# Patient Record
Sex: FEMALE | NOT HISPANIC OR LATINO | Employment: FULL TIME | URBAN - METROPOLITAN AREA
[De-identification: names, ages, dates, MRNs, and addresses within clinical notes are randomized per-mention and may not be internally consistent; named-entity substitution may affect disease eponyms.]

---

## 2025-05-16 DIAGNOSIS — M79.642 HAND PAIN, LEFT: Primary | ICD-10-CM

## 2025-05-18 NOTE — PROGRESS NOTES
[facility]  Hand and Upper Extremity Service  Initial evaluation / Consultation         Consult requested by Referring Physician: Dr. Christine     Chief Complaint: Left middle finger          59 y.o right hand dominant female presenting for left middle finger fracture. In mid-February she was walking her dog in [place] and got the leash wrapped around her finger, resulting in a fracture of the middle finger phalanx. She was seen by an orthopedic surgeon in [place] who performed a closed reduction in the office to correct a rotation deformity and then treated the patient nonoperatively with a serial of x-rays. She has now returned to [place] and still has pain and stiffness and is not happy with the appearence of the finger and presents for second opinion.          Please refer to New Patient Intake Form scanned into patient's electronic record for self reported past medical history, past surgical history, medications, allergies, family history, social history and 10 point review of systems    Examination:  Constitutional: Oriented to person, place, and time.  Appears well-developed and well-nourished.  Head: Normocephalic and atraumatic.  Eyes: Pupils are equal, round, and reactive to light.  Cardiovascular: Intact distal pulses.  Pulmonary/Chest/Breast: Effort normal. No respiratory distress.  Neurological: Alert and oriented to person, place, and time.  Skin: Skin is warm and dry.  Psychiatric: normal mood and affect.  Behavior is normal.  Musculoskeletal: Left hand reveals circumferential swelling around middle finger centered at the proximal phalanx and PIP joint. About a 5 degree extensor lag at the PIP joint. Active flexion to about 70 degrees. Full MP joint range of motion and about 30 degrees of DIP joint range of motion. Slight supination rotational deformity of the middle finger with flexion.        Personal Interpretation of Diagnostic studies: X-ray of left hand taken  today demonstrates spiral oblique fracture at the proximal phalanx of the middle finger that appears to be well healed radiographically with abundant callous formation. Fracture line is still visible and fracture is slightly displaced.        Impression:  Left middle finger proximal phalanx malunion       Plan: I believe the window to intervention has long passed and while surgery to correct deformity of the bone may make the x-rays look better, this will likely make her functional use of the hand much worse. I have recommended referral to therapy to see if this will help with mobilization but she may not be able to reestablish full composite digital flexion. She accepted a referral to therapy and I am happy to see her again in the future but I do not think surgery is the best option for this problem.       Follow up: As needed               Cyril Greene MD  [facility]  Department of Orthopaedic Surgery  Hand and Upper Extremity Reconstruction      Scribe Attestation  By signing my name below, I, Adis Alberts   attest that this documentation has been prepared under the direction and in the presence of Dr. Cyril Greene.      Dictation performed with the use of voice recognition software.  Syntax and grammatical errors may exist.

## 2025-05-19 ENCOUNTER — HOSPITAL ENCOUNTER (OUTPATIENT)
Dept: RADIOLOGY | Facility: CLINIC | Age: 60
Discharge: HOME | End: 2025-05-19
Payer: COMMERCIAL

## 2025-05-19 ENCOUNTER — APPOINTMENT (OUTPATIENT)
Dept: ORTHOPEDIC SURGERY | Facility: CLINIC | Age: 60
End: 2025-05-19
Payer: COMMERCIAL

## 2025-05-19 VITALS — WEIGHT: 155 LBS | HEIGHT: 68 IN | BODY MASS INDEX: 23.49 KG/M2

## 2025-05-19 DIAGNOSIS — S62.613P: Primary | ICD-10-CM

## 2025-05-19 DIAGNOSIS — M79.642 HAND PAIN, LEFT: ICD-10-CM

## 2025-05-19 PROCEDURE — 73130 X-RAY EXAM OF HAND: CPT | Mod: LT

## 2025-05-19 PROCEDURE — 73130 X-RAY EXAM OF HAND: CPT | Mod: LEFT SIDE | Performed by: STUDENT IN AN ORGANIZED HEALTH CARE EDUCATION/TRAINING PROGRAM

## 2025-05-19 PROCEDURE — 99204 OFFICE O/P NEW MOD 45 MIN: CPT | Performed by: ORTHOPAEDIC SURGERY

## 2025-05-19 PROCEDURE — 3008F BODY MASS INDEX DOCD: CPT | Performed by: ORTHOPAEDIC SURGERY

## 2025-06-12 ENCOUNTER — EVALUATION (OUTPATIENT)
Dept: OCCUPATIONAL THERAPY | Facility: CLINIC | Age: 60
End: 2025-06-12
Payer: COMMERCIAL

## 2025-06-12 DIAGNOSIS — M25.642 DECREASED RANGE OF MOTION OF FINGER OF LEFT HAND: Primary | ICD-10-CM

## 2025-06-12 DIAGNOSIS — S62.613P: ICD-10-CM

## 2025-06-12 PROCEDURE — 97110 THERAPEUTIC EXERCISES: CPT | Mod: GO | Performed by: OCCUPATIONAL THERAPIST

## 2025-06-12 PROCEDURE — 97165 OT EVAL LOW COMPLEX 30 MIN: CPT | Mod: GO | Performed by: OCCUPATIONAL THERAPIST

## 2025-06-12 ASSESSMENT — ENCOUNTER SYMPTOMS
LOSS OF SENSATION IN FEET: 0
DEPRESSION: 0
OCCASIONAL FEELINGS OF UNSTEADINESS: 0

## 2025-06-12 ASSESSMENT — PATIENT HEALTH QUESTIONNAIRE - PHQ9
1. LITTLE INTEREST OR PLEASURE IN DOING THINGS: NOT AT ALL
2. FEELING DOWN, DEPRESSED OR HOPELESS: NOT AT ALL
SUM OF ALL RESPONSES TO PHQ9 QUESTIONS 1 AND 2: 0

## 2025-06-12 NOTE — PROGRESS NOTES
Occupational Therapy Evaluation    Patient Name:  Randa Guillen   Patient MRN: [id number]  Date: 6/12/2025  Time Calculation  Start Time: 1455  Stop Time: 1525  Time Calculation (min): 30 min    Total Timed Minutes: 15  Total Untimed Minutes: 15    OT Evaluation Time Entry  Evaluation (Low) Time Entry: 15  OT Therapeutic Procedures Time Entry  Therapeutic Exercise Time Entry: 10  Orthotic Management Training Initial Enctr Time Entry: 5                   ASSESSMENT:  Patient was referred to occupational therapy for an evaluation and treatment s/p left III digit proximal phalanx fracture. OT evaluation completed this date.  Patient's main functional deficits include difficulty opening jars, keyboarding, using knife and fork and unable to perform weightlifting at the gym.  Patient would benefit from skilled OT in order to increase left III digit AROM, left  strength and overall functional use of left hand.  Patient was issued written and illustrated handouts for blue putty strengthening program, LMB splint for PIP ext and volar PIP ext night splint for HEP to address these deficits. Patient verbalizes good understanding of HEP and splint wearing schedule and precautions.    PLAN:       OT intervention plan includes: education/instruction, home program, manual therapy, therapeutic activities, therapeutic exercises, splinting, and fluidotherapy.  Frequency and duration: 1-2 visits as needed  Potential to achieve rehab goals is good.    Plan of care was developed with input and agreement by the patient.     Insurance:  Visit number: 1 of 4  Insurance Type: Payor: [facility] / Plan: [facility] / Product Type: *No Product type* /   Authorization or Plan of Care date Range:  30V PCY 0 USED NO AUTH NEEDFED PAYS %   Copay: $55 COPAY  Referred by: Cyril Greene MD     SUBJECTIVE:  Patient is a 59 year old who attends OT evaluation today with concerns from L III digit proximal  phalanx fracture. she reports in mid-February she was walking her dog in [place] and got the leash wrapped around her finger, resulting in a fracture of the middle finger phalanx. She was seen by an orthopedic surgeon in [place] who performed a closed reduction in the office to correct a rotation deformity and then treated the patient nonoperatively with a serial of x-rays. She consulted with Dr. Greene when returning to [place] for a second opinion.  Dr. Greene does not feel surgical intervention at this time will improve her function and has referred Randa to OT to increase AROM.      she is RHD   her chief complaint is inability to fully bend and straighten her finger.  her goal for Occupational Therapy is to return to full use of left hand for holding and carrying objects and to return to weightlifting at the gym.    she lives alone in a single family home. she works full time in IT.    General:  Reason for visit: left III digit proximal phalanx fracture  Referred by: Dr. Cyril Greene    Type of surgery: No surgery found  Date of surgery: No surgery found  Days since surgery: No surgery found        Current Problem:         Problem List Items Addressed This Visit           ICD-10-CM    Decreased range of motion of finger of left hand - Primary M25.642    Relevant Orders    Follow Up In Occupational Therapy     Other Visit Diagnoses         Codes      Closed displaced fracture of proximal phalanx of left middle finger with malunion     S62.613P            Medical Screening:       Reviewed medical history form with patient and medical screening assessed.        Medical History Form scanned into chart    Precautions:         Fall Risk: None         Denies: Pacemaker        Past Surgical history: right wrist surgeries        Past Medical history: unremarkable        Pain Assessment:      Pain Assessment: 0-10      Pain (0-10): 1       Pain Location left hand    OBJECTIVE:  Active range of motion:  WNL left  "wrist    Left III Digit:  - MCP ext: 4  - MCP flex: 72  - PIP ext: -12  - PIP flex: 82  - DIP ext: 0  - DIP flex: 44    Strength:  Right  20#  Left  30#    Outcome Measures:  OT Adult Other Outcome Measures  Other Outcome Measures: Quick Dash 28  (38.64%)      Goals:  By discharge date, patient to increase left III digit AROM to 245 degrees in order to improve independent performance in daily activities.     By discharge date, patient will improve left  strength to 40# to improve performance in lifting and grasping tasks.     By discharge date, patient to improve QuickDASH score to at or below 20% to increase independency in ADLs and IADLs.      By discharge date, patient will report understanding of home program, demonstrate independence and verbalize precautions.    Treatment Performed: (\"NP\" = Not Performed)     Treatment:  Low Complex OT Eval: 15 min    Therapeutic Exercise: 10 min  HEP:    Blue putty- , roll and tip pinch, lumbrical pinch, and pinch and pull  LMB PIP extension splint for III digit to be worn 2x/day up to 20 min    Therapeutic Activities: NP  -   Manual Therapy: NP  -   Neuromuscular Re-Education: NP  -   Modalities: NP  -   Orthotic Fit and Treat:  5 min  -Fabricated volar gutter PIP extension splint for III digit for night wear    Education: Home exercise program instructed and issued. Access Code: [email or web address]  "